# Patient Record
(demographics unavailable — no encounter records)

---

## 2025-01-08 NOTE — PLAN
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 Urine collected for UA and culture and sent out All questions were answered Follow-up as needed

## 2025-01-08 NOTE — PROCEDURE
[Transvaginal Ultrasound] : transvaginal ultrasound [Anteverted] : anteverted [L: ___ cm] : L: [unfilled] cm [W: ___cm] : W: [unfilled] cm [H: ___ cm] : H: [unfilled] cm [FreeTextEntry3] : Unable to do a saline infusion sonohysterogram due to the fact that the patient has a stenotic cervical os [FreeTextEntry5] : Endometrial stripe is 3.0 mm [FreeTextEntry7] : 0.93 x 0.53 x 1.27 cm [FreeTextEntry8] : 1.14 x 0.91 x 0.99 cm [FreeTextEntry4] : Normal uterus and ovaries.  There is a scant amount of fluid within the uterus itself and was able to visualize endometrium on both sides which measures 3 mm.

## 2025-01-08 NOTE — HISTORY OF PRESENT ILLNESS
[FreeTextEntry1] : 62-year-old para 2-0-0-2 presents as a follow-up from an ER visit.  Patient states that she was at home cleaning up around [place] and she was doing a lot of heavy lifting and felt that her underwear was soiled when she went to the bathroom and noticed that she had some bleeding.  She had bleeding with clots and cleaned herself up and noticed bleeding again the following morning after waking up.  The patient took herself to the ED for evaluation as she was very concerned at this point in time.  In the ED she states she had an ultrasound and was told that everything was fine and to follow-up with her OB/GYN.  She has not had any further bleeding since.  Patient denies any sexual encounters.  Patient also complained of some dysuria  Postmenopausal

## 2025-01-08 NOTE — PHYSICAL EXAM
[Chaperone Present] : A chaperone was present in the examining room during all aspects of the physical examination [34574] : A chaperone was present during the pelvic exam. [Appropriately responsive] : appropriately responsive [Oriented x3] : oriented x3 [Vulvar Atrophy] : vulvar atrophy [Labia Majora] : normal [Labia Minora] : normal [Atrophy] : atrophy [Cervical Stenosis] : cervical stenosis [Normal] : normal [Uterine Adnexae] : normal [FreeTextEntry2] : Oriana Roth [FreeTextEntry5] : Pinpoint opening with some erythema

## 2025-03-12 NOTE — HISTORY OF PRESENT ILLNESS
[FreeTextEntry1] : 62-year-old para 2-0-0-2 presents as a follow-up for treatment with lichen sclerosis.  She has not had any further bleeding since.  Patient also complained of urinary frequency.  Patient requesting to have her follow-up sono done today.

## 2025-03-12 NOTE — PLAN
[FreeTextEntry1] : Continue to use clobetasol Will add hyaluronic acid gel to the therapy Follow-up in 3 to 4 months